# Patient Record
(demographics unavailable — no encounter records)

---

## 2025-07-02 NOTE — HISTORY OF PRESENT ILLNESS
[Back Pain] : back pain [___ yrs] : [unfilled] year(s) ago [Constant] : constant [9] : a minimum pain level of 9/10 [10] : a maximum pain level of 10/10 [Aching] : aching [Sitting] : sitting [Bending] : bending [Medications] : medications [Other: ___] : [unfilled] [Neck Pain] : neck pain [FreeTextEntry1] : Patient was provided option of utilizing  services, but patient declined and would like to proceed with visit by using interpretation assistance from family member/staff.HPI     Ms. KATHE FRANK is a 36 year F with pmhx of chronic left sided neck shoulder and lower back pain. Transient improvement with PT and injections in the past.  Reports worsening of her pain. OTC medicines and muscle relaxers  mildly effective when pain is severe. Pain is impacting her quality of life and ability to perform ADL's. Denies any additional weakness, numbness, bowel/bladder dysfunction, history of bleeding disorders.   Previous and current pain medications/doses/effects: Motrin, Tylenol      Previous Pain Treatments:     Previous Pain Injections:     Previous Diagnostic Studies/Images:

## 2025-07-02 NOTE — ASSESSMENT
[FreeTextEntry1] : >> Imaging and Other Studies   I personally reviewed the relevant imaging.  Discussed and explained to patient the likely source of pathology and pain.  Questions answered.  back and leg pain likely secondary to lumbar radiculopathy and discogenic pain refractory to conservative treatments including 6 consecutive weeks of home exercises/PT, will obtain MRI LS to evaluate for pathology  may consider PT vs intervention pending eval Neck and arm pain likely secondary to cervical radiculopathy and discogenic pain vs spondylosis refractory to conservative treatments including 6 consecutive weeks of PT/home exercises, will obtain MRI CS to evaluate for pathology  may consider PT vs intervention pending eval >> Therapy and Other Modalities   PT  >> Medications   tizanidine prn spasm/pain  acetaminophen 650mg q8h prn pain (caution <3g daily) >> Interventions   >> Consults   >> Discussion of Risks/Benefits/Alternatives    >Regarding any scheduled procedures:   In the event the patient is scheduled for a procedure,   I have discussed in detail with the patient that any interventional pain procedure is associated with potential risks.  The procedure may include an injection of steroids and potentially other medications (local anesthetic and normal saline) into the epidural space or surrounding tissue of the spine.  There are significant risks of this procedure which include and are not limited to infection, bleeding, worsening pain, dural puncture leading to postdural puncture headache, nerve damage, spinal cord injury, paralysis, stroke, and death.   There is a chance that the procedure does not improve their pain.   There are risks associated with the steroid being absorbed into the body systemically.  These include dysphoria, difficulty sleeping, mood swings and personality changes.  Premenopausal women may notice an irregularity in her menstrual cycle for 2-3 months following the injection.  Steroids can specifically affect patients with hypertension, diabetes, and peptic ulcers.  The procedure may cause a temporary increase in blood pressure and blood pressure, and may adversely affect a peptic ulcer.  Other, more rare complications, include avascular necrosis of joints, glaucoma and worsening of osteoporosis.   I have discussed the risks of the procedure at length with the patient, and the potential benefits of pain relief.  I have offered alternatives to the procedure.  All questions were answered.   The patient expressed understanding and wishes to proceed with the procedure.   > Longitudinal management of Complex Painful condition   The patient is being managed for a complex condition that requires ongoing management.  The nature of this condition demands nuanced approach to treatment.  The seriousness of the condition necessitates an in-depth and focused approach to management and coordination with other healthcare professionals.    This visit involves intricate evaluation and management of the patient's condition.  The complexity of the visit was due to the need for detailed assessment of the current state, consideration of potential complications and a careful balancing of treatment options to management the chronic condition effectively.   As detailed above, the patient has a chronic significant painful condition that requires regular and detailed management.  The condition's impact on the patient's quality of life and health is substantial and necessitates a comprehensive and tailored approach   >> Conclusion   There were no barriers to communication. Informed patient that I would be available for any additional questions. Patient was instructed to call with any worsening symptoms including severe pain, new numbness/weakness, or changes in the bowel/bladder function. Discussed role of nsaids in pain management and all relevant risks, if patient is continuing to require after 4 weeks the patient should f/u for alternative treatment. Instructed patient to maintain pain diary to monitor pain level, mobility, and function.   The referring provider was informed of the above diagnosis and treatment plan.

## 2025-07-29 NOTE — ASSESSMENT
[FreeTextEntry1] : >> Imaging and Other Studies   I personally reviewed the relevant imaging.  Discussed and explained to patient the likely source of pathology and pain.  Questions answered. XR L shoulder  I personally reviewed the relevant imaging.  Discussed and explained to patient the likely source of pathology and pain.  Questions answered. may consider PT vs intervention pending eval Neck and arm pain likely secondary to cervical radiculopathy and discogenic pain vs spondylosis refractory to conservative treatments including 6 consecutive weeks of PT/home exercises, will obtain MRI CS to evaluate for pathology  may consider PT vs intervention pending eval >> Therapy and Other Modalities   PT  >> Medications   tizanidine prn spasm/pain  acetaminophen 650mg q8h prn pain (caution <3g daily) >> Interventions   SIgnificant pain maladaptive, with + SIJ provocative tests and refractory to conservative treatments.  Will schedule LEFT  SIJ steroid injection r/b/a discussed >> Consults   >> Discussion of Risks/Benefits/Alternatives    >Regarding any scheduled procedures:   In the event the patient is scheduled for a procedure,   I have discussed in detail with the patient that any interventional pain procedure is associated with potential risks.  The procedure may include an injection of steroids and potentially other medications (local anesthetic and normal saline) into the epidural space or surrounding tissue of the spine.  There are significant risks of this procedure which include and are not limited to infection, bleeding, worsening pain, dural puncture leading to postdural puncture headache, nerve damage, spinal cord injury, paralysis, stroke, and death.   There is a chance that the procedure does not improve their pain.   There are risks associated with the steroid being absorbed into the body systemically.  These include dysphoria, difficulty sleeping, mood swings and personality changes.  Premenopausal women may notice an irregularity in her menstrual cycle for 2-3 months following the injection.  Steroids can specifically affect patients with hypertension, diabetes, and peptic ulcers.  The procedure may cause a temporary increase in blood pressure and blood pressure, and may adversely affect a peptic ulcer.  Other, more rare complications, include avascular necrosis of joints, glaucoma and worsening of osteoporosis.   I have discussed the risks of the procedure at length with the patient, and the potential benefits of pain relief.  I have offered alternatives to the procedure.  All questions were answered.   The patient expressed understanding and wishes to proceed with the procedure.   > Longitudinal management of Complex Painful condition   The patient is being managed for a complex condition that requires ongoing management.  The nature of this condition demands nuanced approach to treatment.  The seriousness of the condition necessitates an in-depth and focused approach to management and coordination with other healthcare professionals.    This visit involves intricate evaluation and management of the patient's condition.  The complexity of the visit was due to the need for detailed assessment of the current state, consideration of potential complications and a careful balancing of treatment options to management the chronic condition effectively.   As detailed above, the patient has a chronic significant painful condition that requires regular and detailed management.  The condition's impact on the patient's quality of life and health is substantial and necessitates a comprehensive and tailored approach   >> Conclusion   There were no barriers to communication. Informed patient that I would be available for any additional questions. Patient was instructed to call with any worsening symptoms including severe pain, new numbness/weakness, or changes in the bowel/bladder function. Discussed role of nsaids in pain management and all relevant risks, if patient is continuing to require after 4 weeks the patient should f/u for alternative treatment. Instructed patient to maintain pain diary to monitor pain level, mobility, and function.   The referring provider was informed of the above diagnosis and treatment plan.

## 2025-07-29 NOTE — PHYSICAL EXAM
[Sacroiliac tenderness] : sacroiliac tenderness [LE] : 5/5 motor strength in bilateral lower extremities [Normal] : Normal affect [DANIEL Test] : negative DANIEL Test (Bola's Test) [de-identified] : Shoulder pain with abduction

## 2025-07-29 NOTE — HISTORY OF PRESENT ILLNESS
[Back Pain] : back pain [Neck Pain] : neck pain [___ yrs] : [unfilled] year(s) ago [Constant] : constant [9] : a minimum pain level of 9/10 [10] : a maximum pain level of 10/10 [Aching] : aching [Sitting] : sitting [Bending] : bending [Medications] : medications [Other: ___] : [unfilled] [FreeTextEntry1] : Patient was provided option of utilizing  services, but patient declined and would like to proceed with visit by using interpretation assistance from family member/staff.HPI  Interval Note:  Patient reports pain in left shoulder and left buttock.  Pain is so bad that patient finds it difficult to perform adls and ambulate. Denies any additional weakness, numbness, bowel/bladder dysfunction.      Ms. KATHE FRANK is a 36 year F with pmhx of chronic left sided neck shoulder and lower back pain. Transient improvement with PT and injections in the past.  Reports worsening of her pain. OTC medicines and muscle relaxers  mildly effective when pain is severe. Pain is impacting her quality of life and ability to perform ADL's. Denies any additional weakness, numbness, bowel/bladder dysfunction, history of bleeding disorders.   Previous and current pain medications/doses/effects: Motrin, Tylenol      Previous Pain Treatments:     Previous Pain Injections:     Previous Diagnostic Studies/Images:    MRI LS 7/25  	 [place] [address]                                        Department of Radiology                                             [phone]   Patient Name:      KATHE FRANK                Location:       [facility] Rec #:        [id number]                    Account #:      [id number] YOB: 1988                    Ordering:       SREE LARIOS Age: 36               Sex:    F                 Attending:      SREE LARIOS PCP:        NO PRIMARY CARE PHYSICIAN ______________________________________________________________________________________  Exam Date:      07/22/25 Exam:         MRI LUMBAR SPINE Order#:       [id number]    CLINICAL INFORMATION: Low back pain   ADDITIONAL CLINICAL INFORMATION: Other Condition see Clinical Info   TECHNIQUE: Multiplanar, multisequence MRI was performed of the lumbar spine.  IV Contrast: NONE   PRIOR STUDIES: No priors available for comparison.   FINDINGS:  The L1 level and is normal in signal. Vertebral body heights are maintained. Alignment is normal. There is mild disc degeneration at L1-L2 and L5-S1.   There are no disc herniations at any level.   L1-L2: No spinal canal or foraminal narrowing.   L2-3: No spinal canal or foraminal narrowing.   L3-L4: No spinal canal or foraminal narrowing.   L4-L5: No spinal canal or foraminal narrowing.   L5-S1: Small right paracentral annular fissure. No spinal canal or foraminal narrowing.   There is no paraspinal muscle atrophy or edema.   The imaged portions of the sacroiliac joints are unremarkable.    IMPRESSION:   Small right paracentral annular fissure at L5-S1.   No disc herniations. No spinal canal stenosis or foraminal narrowing at any level.   --- End of Report ---          .